# Patient Record
Sex: FEMALE | Race: WHITE | NOT HISPANIC OR LATINO | Employment: FULL TIME | URBAN - METROPOLITAN AREA
[De-identification: names, ages, dates, MRNs, and addresses within clinical notes are randomized per-mention and may not be internally consistent; named-entity substitution may affect disease eponyms.]

---

## 2021-03-10 ENCOUNTER — IMMUNIZATION (OUTPATIENT)
Dept: VACCINE CLINIC | Facility: HOSPITAL | Age: 36
End: 2021-03-10

## 2021-03-10 PROCEDURE — 0001A: CPT | Performed by: OBSTETRICS & GYNECOLOGY

## 2021-03-10 PROCEDURE — 91300 HC SARSCOV02 VAC 30MCG/0.3ML IM: CPT | Performed by: OBSTETRICS & GYNECOLOGY

## 2021-03-31 ENCOUNTER — IMMUNIZATION (OUTPATIENT)
Dept: VACCINE CLINIC | Facility: HOSPITAL | Age: 36
End: 2021-03-31

## 2021-03-31 PROCEDURE — 91300 HC SARSCOV02 VAC 30MCG/0.3ML IM: CPT | Performed by: OBSTETRICS & GYNECOLOGY

## 2021-03-31 PROCEDURE — 0002A: CPT | Performed by: OBSTETRICS & GYNECOLOGY

## 2025-05-23 ENCOUNTER — HOSPITAL ENCOUNTER (EMERGENCY)
Facility: HOSPITAL | Age: 40
Discharge: HOME | End: 2025-05-23
Attending: EMERGENCY MEDICINE
Payer: COMMERCIAL

## 2025-05-23 VITALS
DIASTOLIC BLOOD PRESSURE: 90 MMHG | SYSTOLIC BLOOD PRESSURE: 140 MMHG | BODY MASS INDEX: 37.56 KG/M2 | HEART RATE: 92 BPM | TEMPERATURE: 98.9 F | RESPIRATION RATE: 18 BRPM | WEIGHT: 220 LBS | OXYGEN SATURATION: 97 % | HEIGHT: 64 IN

## 2025-05-23 DIAGNOSIS — J02.9 PHARYNGITIS, UNSPECIFIED ETIOLOGY: Primary | ICD-10-CM

## 2025-05-23 LAB
FLUAV RNA RESP QL NAA+PROBE: NOT DETECTED
FLUBV RNA RESP QL NAA+PROBE: NOT DETECTED
RSV RNA RESP QL NAA+PROBE: NOT DETECTED
S PYO AG THROAT QL: NEGATIVE
SARS-COV-2 RNA RESP QL NAA+PROBE: NOT DETECTED

## 2025-05-23 PROCEDURE — 87880 STREP A ASSAY W/OPTIC: CPT | Performed by: EMERGENCY MEDICINE

## 2025-05-23 PROCEDURE — 25010000002 DEXAMETHASONE PER 1 MG: Performed by: EMERGENCY MEDICINE

## 2025-05-23 PROCEDURE — 87637 SARSCOV2&INF A&B&RSV AMP PRB: CPT | Performed by: EMERGENCY MEDICINE

## 2025-05-23 PROCEDURE — 99283 EMERGENCY DEPT VISIT LOW MDM: CPT | Performed by: EMERGENCY MEDICINE

## 2025-05-23 PROCEDURE — 87081 CULTURE SCREEN ONLY: CPT | Performed by: EMERGENCY MEDICINE

## 2025-05-23 RX ORDER — PREDNISONE 20 MG/1
TABLET ORAL
Qty: 9 TABLET | Refills: 0 | Status: SHIPPED | OUTPATIENT
Start: 2025-05-23

## 2025-05-23 RX ORDER — DEXAMETHASONE SODIUM PHOSPHATE 4 MG/ML
10 VIAL (ML) INJECTION ONCE
Status: COMPLETED | OUTPATIENT
Start: 2025-05-23 | End: 2025-05-23

## 2025-05-23 RX ADMIN — DEXAMETHASONE SODIUM PHOSPHATE 10 MG: 4 INJECTION, SOLUTION INTRAMUSCULAR; INTRAVENOUS at 05:15

## 2025-05-23 NOTE — ED PROVIDER NOTES
Subjective   History of Present Illness  Patient presents complaining of a 4-day history of sore throat.  Patient denies any known exposure.  Patient able to swallow liquids but is painful.  No therapy taken prior to arrival.  Swallowing makes it worse.  Nothing seems to make it better.  Patient denies any cough, vomiting, fever and no history of peritonsillar abscess.      Review of Systems   All other systems reviewed and are negative.      No past medical history on file.    Allergies   Allergen Reactions    Cat Dander Other (See Comments)     Sneezing    Molds & Smuts Rash    Other Itching     Dogs    Tree Extract Itching       Past Surgical History:   Procedure Laterality Date     SECTION         Family History   Problem Relation Age of Onset    Breast cancer Maternal Grandmother     Breast cancer Maternal Aunt     Breast cancer Paternal Aunt        Social History     Socioeconomic History    Marital status: Significant Other   Tobacco Use    Smoking status: Never    Smokeless tobacco: Never   Vaping Use    Vaping status: Never Used   Substance and Sexual Activity    Alcohol use: Not Currently    Drug use: Defer    Sexual activity: Defer           Objective   Physical Exam  Vitals and nursing note reviewed.   Constitutional:       Comments: Patient sitting in bed comfortably, friendly, no signs of distress.  Cooperative with exam.  Patient is using pen and paper to communicate due to pain with vocalization.  Patient is able to vocalize though.   HENT:      Head: Normocephalic.      Mouth/Throat:      Mouth: Mucous membranes are moist. No oral lesions.      Pharynx: Oropharynx is clear. Uvula midline. No oropharyngeal exudate or posterior oropharyngeal erythema.      Tonsils: No tonsillar exudate or tonsillar abscesses.   Eyes:      Conjunctiva/sclera: Conjunctivae normal.      Pupils: Pupils are equal, round, and reactive to light.   Pulmonary:      Effort: Pulmonary effort is normal.   Musculoskeletal:       Cervical back: Normal range of motion and neck supple.   Lymphadenopathy:      Cervical: No cervical adenopathy.   Skin:     General: Skin is warm and dry.      Capillary Refill: Capillary refill takes 2 to 3 seconds.   Neurological:      Mental Status: She is alert and oriented to person, place, and time.   Psychiatric:         Mood and Affect: Mood normal.         Behavior: Behavior normal.         Procedures           ED Course                                                       Medical Decision Making  Ddx bacterial pharyngitis, viral pharyngitis, peritonsillar abscess, upper respiratory infection, viral syndrome    Labs Reviewed  RAPID STREP A SCREEN - Normal  COVID-19/FLUA&B/RSV, NP SWAB IN TRANSPORT MEDIA 1 HR TAT - Normal         Narrative: Fact sheet for providers: https://www.fda.gov/media/788419/download                    Fact sheet for patients: https://www.fda.gov/media/209085/download                                    Test performed by PCR.  BETA HEMOLYTIC STREP CULTURE, THROAT    0453 Pt seen again prior to d/c.  Labs reviewed and are unremarkable.  Symptoms improved and pt feels better, vitals stable and pt. in NAD. Non-toxic. Comfortable. Ambulating without difficulty.  Tolerating po.  Relaxed breathing.  All questions personally answered at the bedside and all d/c instructions personally reviewed with pt.  Discussed the importance of close outpt. f/u and pt. understands this and agrees to do so.  Pt agrees to return to ED immediately for any new, persistent, or worsening symptoms.    [employer]/Transcription disclaimer:  Much of this encounter note is an electronic transcription/translation of spoken language to printed text using the Dragon Dictation System       Problems Addressed:  Pharyngitis, unspecified etiology: complicated acute illness or injury    Risk  Prescription drug management.        Final diagnoses:   Pharyngitis, unspecified etiology       ED Disposition  ED Disposition        ED Disposition   Discharge    Condition   Stable    Comment   --               Bryce Lopez MD  [address] [id number] [phone]    In 3 days  If symptoms worsen         Medication List        New Prescriptions      predniSONE 20 MG tablet  Commonly known as: DELTASONE  Take 3 tabs p.o. daily on day 1, then 2 tabs p.o. on days 2-3, then 1 tab p.o. on days 4-5.               Where to Get Your Medications        These medications were sent to [facility] [id number] - [address] [phone] [facility] [phone] [address] [address]      Phone: [phone]   predniSONE 20 MG tablet            Bari Sutherland MD  05/23/25 [id number]

## 2025-05-23 NOTE — Clinical Note
[facility] EMERGENCY DEPARTMENT  [address] [place] [address]  Phone: [phone]    Zara Mcneil was seen and treated in our emergency department on 5/23/2025.  She may return to work on 05/26/2025.         Thank you for choosing [facility].    Bari Sutherland MD

## 2025-05-25 LAB — BACTERIA SPEC AEROBE CULT: NORMAL
